# Patient Record
Sex: MALE | NOT HISPANIC OR LATINO | URBAN - METROPOLITAN AREA
[De-identification: names, ages, dates, MRNs, and addresses within clinical notes are randomized per-mention and may not be internally consistent; named-entity substitution may affect disease eponyms.]

---

## 2017-03-03 ENCOUNTER — APPOINTMENT (OUTPATIENT)
Dept: URBAN - METROPOLITAN AREA CLINIC 319 | Age: 37
Setting detail: DERMATOLOGY
End: 2017-03-03

## 2017-03-03 DIAGNOSIS — L90.5 SCAR CONDITIONS AND FIBROSIS OF SKIN: ICD-10-CM

## 2017-03-03 DIAGNOSIS — L20.89 OTHER ATOPIC DERMATITIS: ICD-10-CM

## 2017-03-03 DIAGNOSIS — L30.3 INFECTIVE DERMATITIS: ICD-10-CM

## 2017-03-03 PROBLEM — L30.9 DERMATITIS, UNSPECIFIED: Status: ACTIVE | Noted: 2017-03-03

## 2017-03-03 PROCEDURE — OTHER REFERRAL: OTHER

## 2017-03-03 PROCEDURE — 99213 OFFICE O/P EST LOW 20 MIN: CPT | Mod: 25

## 2017-03-03 PROCEDURE — OTHER COUNSELING: OTHER

## 2017-03-03 PROCEDURE — 96372 THER/PROPH/DIAG INJ SC/IM: CPT

## 2017-03-03 PROCEDURE — OTHER INJECTION: OTHER

## 2017-03-03 PROCEDURE — OTHER TREATMENT REGIMEN: OTHER

## 2017-03-03 PROCEDURE — OTHER PRESCRIPTION: OTHER

## 2017-03-03 RX ORDER — TRIAMCINOLONE ACETONIDE 1 MG/G
CREAM TOPICAL
Qty: 1 | Refills: 3 | Status: ERX | COMMUNITY
Start: 2017-03-03

## 2017-03-03 RX ORDER — CLOBETASOL PROPIONATE 0.5 MG/G
OINTMENT TOPICAL
Qty: 2 | Refills: 3 | Status: ERX | COMMUNITY
Start: 2017-03-03

## 2017-03-03 RX ORDER — CEPHALEXIN 500 MG/1
TABLET ORAL BID
Qty: 28 | Refills: 3 | Status: CANCELLED
Stop reason: SDUPTHER

## 2017-03-03 RX ORDER — HYDROXYZINE HYDROCHLORIDE 10 MG/1
TABLET, FILM COATED ORAL
Qty: 120 | Refills: 3 | Status: ERX | COMMUNITY
Start: 2017-03-03

## 2017-03-03 ASSESSMENT — LOCATION SIMPLE DESCRIPTION DERM
LOCATION SIMPLE: UPPER BACK
LOCATION SIMPLE: POSTERIOR SCALP
LOCATION SIMPLE: LEFT HAND
LOCATION SIMPLE: ABDOMEN
LOCATION SIMPLE: RIGHT BUTTOCK
LOCATION SIMPLE: RIGHT HAND

## 2017-03-03 ASSESSMENT — LOCATION DETAILED DESCRIPTION DERM
LOCATION DETAILED: INFERIOR THORACIC SPINE
LOCATION DETAILED: RIGHT RADIAL DORSAL HAND
LOCATION DETAILED: EPIGASTRIC SKIN
LOCATION DETAILED: MID-OCCIPITAL SCALP
LOCATION DETAILED: RIGHT BUTTOCK
LOCATION DETAILED: LEFT RADIAL DORSAL HAND

## 2017-03-03 ASSESSMENT — LOCATION ZONE DERM
LOCATION ZONE: TRUNK
LOCATION ZONE: HAND
LOCATION ZONE: SCALP

## 2017-03-03 NOTE — PROCEDURE: TREATMENT REGIMEN
Otc Regimen: Aquaphor ointment twice daily\\nSarna lotion (anti-itch cream)\\nCerave moisturizing cream twice daily
Detail Level: Detailed
Plan: Patient will call Maricarmen every Tuesday and fridays to let us now how he is doing

## 2017-03-14 ENCOUNTER — APPOINTMENT (OUTPATIENT)
Dept: URBAN - METROPOLITAN AREA CLINIC 319 | Age: 37
Setting detail: DERMATOLOGY
End: 2017-03-14

## 2017-03-14 DIAGNOSIS — L20.89 OTHER ATOPIC DERMATITIS: ICD-10-CM

## 2017-03-14 PROBLEM — L30.9 DERMATITIS, UNSPECIFIED: Status: ACTIVE | Noted: 2017-03-14

## 2017-03-14 PROCEDURE — OTHER COUNSELING: OTHER

## 2017-03-14 PROCEDURE — 99213 OFFICE O/P EST LOW 20 MIN: CPT

## 2017-03-14 PROCEDURE — OTHER TREATMENT REGIMEN: OTHER

## 2017-03-14 ASSESSMENT — LOCATION SIMPLE DESCRIPTION DERM
LOCATION SIMPLE: RIGHT FOREARM
LOCATION SIMPLE: LEFT FOREARM

## 2017-03-14 ASSESSMENT — LOCATION ZONE DERM: LOCATION ZONE: ARM

## 2017-03-14 ASSESSMENT — LOCATION DETAILED DESCRIPTION DERM
LOCATION DETAILED: LEFT PROXIMAL DORSAL FOREARM
LOCATION DETAILED: RIGHT PROXIMAL DORSAL FOREARM

## 2017-03-14 NOTE — PROCEDURE: TREATMENT REGIMEN
Modify Regimen: Hold:Clobetasol 0.05 % topical ointment (Apply Twice a day to affected areas on arms,legs,back and trunk)
Continue Regimen: Triamcinolone acetonide 0.1 % topical cream (apply two times daily to affected areas on arms,legs,back and trunk until clear),
Otc Regimen: Cerave moisturizing cream
Discontinue Regimen: Hydroxyzine HCl 10 mg tablet (Take 1-2 pills every 4-6 hours as needed for itch),
Detail Level: Detailed

## 2017-07-20 RX ORDER — CLOBETASOL PROPIONATE 0.5 MG/G
OINTMENT TOPICAL
Qty: 2 | Refills: 3 | Status: ERX